# Patient Record
Sex: FEMALE | Race: OTHER | HISPANIC OR LATINO | Employment: OTHER | URBAN - METROPOLITAN AREA
[De-identification: names, ages, dates, MRNs, and addresses within clinical notes are randomized per-mention and may not be internally consistent; named-entity substitution may affect disease eponyms.]

---

## 2023-01-25 ENCOUNTER — HOSPITAL ENCOUNTER (EMERGENCY)
Facility: HOSPITAL | Age: 38
Discharge: HOME | End: 2023-01-25
Attending: EMERGENCY MEDICINE

## 2023-01-25 VITALS
TEMPERATURE: 99.1 F | WEIGHT: 151.24 LBS | DIASTOLIC BLOOD PRESSURE: 99 MMHG | HEART RATE: 74 BPM | SYSTOLIC BLOOD PRESSURE: 157 MMHG | RESPIRATION RATE: 20 BRPM | OXYGEN SATURATION: 97 %

## 2023-01-25 DIAGNOSIS — O03.9 MISCARRIAGE: Primary | ICD-10-CM

## 2023-01-25 DIAGNOSIS — N39.0 UTI (URINARY TRACT INFECTION): ICD-10-CM

## 2023-01-25 LAB
ABO GROUP BLD: NORMAL
B-HCG SERPL-ACNC: 10 MIU/ML (ref 0–11.6)
BACTERIA UR QL AUTO: ABNORMAL /HPF
BILIRUB UR QL STRIP: NEGATIVE
CLARITY UR: ABNORMAL
COLOR UR: ABNORMAL
EXT PREGNANCY TEST URINE: NEGATIVE
EXT. CONTROL: NORMAL
GLUCOSE UR STRIP-MCNC: NEGATIVE MG/DL
HGB UR QL STRIP.AUTO: ABNORMAL
KETONES UR STRIP-MCNC: ABNORMAL MG/DL
LEUKOCYTE ESTERASE UR QL STRIP: ABNORMAL
NITRITE UR QL STRIP: POSITIVE
NON-SQ EPI CELLS URNS QL MICRO: ABNORMAL /HPF
PH UR STRIP.AUTO: 5.5 [PH]
PROT UR STRIP-MCNC: ABNORMAL MG/DL
RBC #/AREA URNS AUTO: ABNORMAL /HPF
RH BLD: POSITIVE
SP GR UR STRIP.AUTO: >=1.03 (ref 1–1.03)
UROBILINOGEN UR QL STRIP.AUTO: 2 E.U./DL
WBC #/AREA URNS AUTO: ABNORMAL /HPF

## 2023-01-25 RX ORDER — CEPHALEXIN 500 MG/1
500 CAPSULE ORAL ONCE
Status: COMPLETED | OUTPATIENT
Start: 2023-01-25 | End: 2023-01-25

## 2023-01-25 RX ORDER — ACETAMINOPHEN 325 MG/1
975 TABLET ORAL ONCE
Status: COMPLETED | OUTPATIENT
Start: 2023-01-25 | End: 2023-01-25

## 2023-01-25 RX ORDER — CEPHALEXIN 500 MG/1
500 CAPSULE ORAL EVERY 6 HOURS SCHEDULED
Qty: 20 CAPSULE | Refills: 0 | Status: SHIPPED | OUTPATIENT
Start: 2023-01-25 | End: 2023-01-30

## 2023-01-25 RX ADMIN — ACETAMINOPHEN 975 MG: 325 TABLET, FILM COATED ORAL at 15:22

## 2023-01-25 RX ADMIN — CEPHALEXIN 500 MG: 500 CAPSULE ORAL at 15:22

## 2023-01-25 NOTE — DISCHARGE INSTRUCTIONS
We have provided information above for [facility]  Call the number above to speak to an   They will assist you with establishing care with a primary care provider  Your symptoms and labs are consistent with a first trimester miscarriage  Your urine also shows evidence of an infection  We have started you on a course of antibiotics to treat this infection  If you have any severe worsening pelvic pain, nausea, vomiting, fevers, chills, thick vaginal discharge or persistent heavy vaginal bleeding as evidenced by 2 soaked pads per hour for 2 consecutive hours, return to the emergency department

## 2023-01-25 NOTE — ED NOTES
Provider Ronna Smith at bedside        [facility]  01/25/23 0304 DISPLAY PLAN FREE TEXT

## 2023-01-25 NOTE — ED PROVIDER NOTES
History  Chief Complaint   Patient presents with   • Vaginal Bleeding - Pregnant     Approx 4 weeks pregnant  Started last night with some pink bleeding, today bleeding more with cramping      HPI  Patient is a 15-year-old  at approximately 4 weeks LMP presenting for evaluation of 2 days of vaginal bleeding, pelvic cramping, concern for passage of fetal tissue  Patient states that LMP was at approximately 12  Patient states that she had a positive home pregnancy test about a week ago  Patient states that she has not had an ultrasound confirming intrauterine pregnancy  Patient states that she noted some pink vaginal discharge yesterday concerning for bleeding and states that today she has soaked through a pad since this morning  Patient states intermittent pelvic cramping which she feels like has resolved  Patient believes that she passed fetal tissue last night  Patient denies vaginal discharge, denies any significant abdominal pain, fevers, chills, vaginal discharge, dysuria, hematuria  Patient states that her previous pregnancy was full-term, delivered breech and required   Prior to Admission Medications   Prescriptions Last Dose Informant Patient Reported? Taking? Prenatal w/o A Vit-Fe Fum-FA (PRENATA PO)   Yes Yes   Sig: Take by mouth in the morning      Facility-Administered Medications: None       Past Medical History:   Diagnosis Date   • Eczema        Past Surgical History:   Procedure Laterality Date   • BREAST SURGERY     •  SECTION     • LIPOSUCTION     • LUNG BIOPSY         History reviewed  No pertinent family history  I have reviewed and agree with the history as documented      E-Cigarette/Vaping   • E-Cigarette Use Never User      E-Cigarette/Vaping Substances     Social History     Tobacco Use   • Smoking status: Never   • Smokeless tobacco: Never   Vaping Use   • Vaping Use: Never used   Substance Use Topics   • Alcohol use: Not Currently   • Drug use: Not Currently       Review of Systems   Constitutional: Negative for chills and fever  Gastrointestinal: Negative for nausea and vomiting  Genitourinary: Positive for vaginal bleeding  Negative for dysuria, flank pain, frequency, hematuria, vaginal discharge and vaginal pain  All other systems reviewed and are negative  Physical Exam  Physical Exam  Vitals and nursing note reviewed  Constitutional:       General: She is not in acute distress  Appearance: Normal appearance  She is not ill-appearing, toxic-appearing or diaphoretic  Comments: Tearful appearing but nontoxic nondistressed   HENT:      Head: Normocephalic and atraumatic  Comments: Moist mucous membranes     Right Ear: External ear normal       Left Ear: External ear normal    Eyes:      General:         Right eye: No discharge  Left eye: No discharge  Cardiovascular:      Comments: Regular rate and rhythm, no murmurs rubs or gallops  Extremities warm and well-perfused without mottling  Pulmonary:      Effort: No respiratory distress  Abdominal:      General: There is no distension  Genitourinary:     Comments: Trace suprapubic tenderness  Abdomen otherwise nontender, nondistended without rigidity, rebound, guarding  Musculoskeletal:         General: No deformity  Cervical back: Normal range of motion  Skin:     Findings: No lesion or rash  Neurological:      Mental Status: She is alert and oriented to person, place, and time  Mental status is at baseline        Comments: AAOx3   Psychiatric:         Mood and Affect: Mood and affect normal          Vital Signs  ED Triage Vitals [01/25/23 1306]   Temperature Pulse Respirations Blood Pressure SpO2   99 1 °F (37 3 °C) 74 20 157/99 97 %      Temp Source Heart Rate Source Patient Position - Orthostatic VS BP Location FiO2 (%)   Tympanic Monitor Sitting Right arm --      Pain Score       3           Vitals:    01/25/23 1306   BP: 157/99   Pulse: 74   Patient Position - Orthostatic VS: Sitting         Visual Acuity      ED Medications  Medications   cephalexin (KEFLEX) capsule 500 mg (500 mg Oral Given 1/25/23 1522)   acetaminophen (TYLENOL) tablet 975 mg (975 mg Oral Given 1/25/23 1522)       Diagnostic Studies  Results Reviewed     Procedure Component Value Units Date/Time    hCG, quantitative [[id number]]  (Normal) Collected: 01/25/23 1416    Lab Status: Final result Specimen: Blood from Arm, Right Updated: 01/25/23 1450     HCG, Quant 10 mIU/mL     Narrative:       Expected Ranges:     Approximate               Approximate HCG  Gestation age          Concentration ( mIU/mL)  _____________          ______________________   Lindsay Sanchez                      HCG values  0 2-1                       5-50  1-2                           2-3                         100-5000  3-4                         500-21573  4-5                         1000-02911  5-6                         [id number]  6-8                         [id number]  8-12                        [id number]      Urinalysis with microscopic [[id number]]  (Abnormal) Collected: 01/25/23 1416    Lab Status: Final result Specimen: Urine, Clean Catch Updated: 01/25/23 1442     Color, UA Dark Mar     Clarity, UA Slightly Cloudy     Specific Gravity, UA >=1 030     pH, UA 5 5     Leukocytes, UA Small     Nitrite, UA Positive     Protein,  (2+) mg/dl      Glucose, UA Negative mg/dl      Ketones, UA Trace mg/dl      Urobilinogen, UA 2 0 E U /dl      Bilirubin, UA Negative     Occult Blood, UA Large     RBC, UA Innumerable /hpf      WBC, UA       Field obscured, unable to enumerate     /hpf     Epithelial Cells Occasional /hpf      Bacteria, UA       Field obscured, unable to enumerate     /hpf    POCT pregnancy, urine [[id number]]  (Normal) Resulted: 01/25/23 1418    Lab Status: Final result Updated: 01/25/23 1418     EXT Preg Test, Ur Negative     Control Valid                 No orders to display Procedures  Procedures         ED Course                               SBIRT 20yo+    Flowsheet Row Most Recent Value   SBIRT (23 yo +)    In order to provide better care to our patients, we are screening all of our patients for alcohol and drug use  Would it be okay to ask you these screening questions? Yes Filed at: 01/25/2023 1330   Initial Alcohol Screen: US AUDIT-C     1  How often do you have a drink containing alcohol? 0 Filed at: 01/25/2023 1330   2  How many drinks containing alcohol do you have on a typical day you are drinking? 0 Filed at: 01/25/2023 1330   3a  Male UNDER 65: How often do you have five or more drinks on one occasion? 0 Filed at: 01/25/2023 1330   3b  FEMALE Any Age, or MALE 65+: How often do you have 4 or more drinks on one occassion? 0 Filed at: 01/25/2023 1330   Audit-C Score 0 Filed at: 01/25/2023 1330   SAWYER: How many times in the past year have you    Used an illegal drug or used a prescription medication for non-medical reasons? Never Filed at: 01/25/2023 1330                    Medical Decision Making  I obtained history from the patient  Patient with pelvic cramping, vaginal bleeding, likely passage of fetal tissue suggestive of first trimester miscarriage  Plan to confirm urine pregnancy test with follow-up quantitative hCG  Patient with a negative urine pregnancy test however quantitative hCG measured at 10  Given patient's recent positive urine pregnancy test, pelvic cramping, passage of tissue, low quantitative hCG, likely represents miscarriage  Patient instructed on expectant management  Urinalysis additionally demonstrating evidence of asymptomatic bacteriuria which was treated with Keflex  Discharged with return precautions  Miscarriage: acute illness or injury  UTI (urinary tract infection): acute illness or injury  Amount and/or Complexity of Data Reviewed  Labs: ordered  Risk  OTC drugs  Prescription drug management            Disposition  Final diagnoses:   Miscarriage   UTI (urinary tract infection)     Time reflects when diagnosis was documented in both MDM as applicable and the Disposition within this note     Time User Action Codes Description Comment    1/25/2023  3:09 PM Yusra Foley Add [O03 9] Miscarriage     1/25/2023  3:09 PM Yusra Foley Add [N39 0] UTI (urinary tract infection)       ED Disposition     ED Disposition   Discharge    Condition   Stable    Date/Time   Wed Jan 25, 2023  3:09 PM    Comment   Gal Bell discharge to home/self care  Follow-up Information     Follow up With Specialties Details Why Contact Info Additional Information    [address] [facility] Emergency Department Emergency Medicine  If symptoms worsen [address] [facility] [address]  [address] [address] Emergency Department, [facility], [address] [place] [address],[address] [facility]    [phone]             Discharge Medication List as of 1/25/2023  3:11 PM      START taking these medications    Details   cephalexin (KEFLEX) 500 mg capsule Take 1 capsule (500 mg total) by mouth every 6 (six) hours for 5 days, Starting Wed 1/25/2023, Until Mon 1/30/2023, Normal         CONTINUE these medications which have NOT CHANGED    Details   Prenatal w/o A Vit-Fe Fum-FA (PRENATA PO) Take by mouth in the morning, Historical Med             No discharge procedures on file      PDMP Review     None          ED Provider  Electronically Signed by           Radha Matta MD  01/25/23 9380

## 2025-06-25 ENCOUNTER — HOSPITAL ENCOUNTER (EMERGENCY)
Facility: HOSPITAL | Age: 40
Discharge: HOME | End: 2025-06-25
Attending: EMERGENCY MEDICINE | Admitting: EMERGENCY MEDICINE
Payer: MEDICAID

## 2025-06-25 VITALS
TEMPERATURE: 97.2 F | OXYGEN SATURATION: 98 % | RESPIRATION RATE: 20 BRPM | HEART RATE: 67 BPM | DIASTOLIC BLOOD PRESSURE: 85 MMHG | SYSTOLIC BLOOD PRESSURE: 130 MMHG | WEIGHT: 158 LBS

## 2025-06-25 DIAGNOSIS — S91.312A LACERATION OF LEFT FOOT, INITIAL ENCOUNTER: Primary | ICD-10-CM

## 2025-06-25 DIAGNOSIS — S91.311A LACERATION OF RIGHT FOOT, INITIAL ENCOUNTER: ICD-10-CM

## 2025-06-25 PROCEDURE — 99284 EMERGENCY DEPT VISIT MOD MDM: CPT | Performed by: EMERGENCY MEDICINE

## 2025-06-25 PROCEDURE — 99282 EMERGENCY DEPT VISIT SF MDM: CPT

## 2025-06-25 PROCEDURE — 12001 RPR S/N/AX/GEN/TRNK 2.5CM/<: CPT | Performed by: EMERGENCY MEDICINE

## 2025-06-25 RX ORDER — GINSENG 100 MG
1 CAPSULE ORAL ONCE
Status: COMPLETED | OUTPATIENT
Start: 2025-06-25 | End: 2025-06-25

## 2025-06-25 RX ORDER — LIDOCAINE HYDROCHLORIDE AND EPINEPHRINE 10; 10 MG/ML; UG/ML
5 INJECTION, SOLUTION INFILTRATION; PERINEURAL ONCE
Status: COMPLETED | OUTPATIENT
Start: 2025-06-25 | End: 2025-06-25

## 2025-06-25 RX ADMIN — LIDOCAINE HYDROCHLORIDE,EPINEPHRINE BITARTRATE 5 ML: 10; .01 INJECTION, SOLUTION INFILTRATION; PERINEURAL at 22:31

## 2025-06-25 RX ADMIN — BACITRACIN ZINC 1 SMALL APPLICATION: 500 OINTMENT TOPICAL at 22:41

## 2025-06-26 NOTE — ED PROVIDER NOTES
Time reflects when diagnosis was documented in both MDM as applicable and the Disposition within this note       Time User Action Codes Description Comment    6/25/2025 10:29 PM Val Zurita Add [S91.319A] Foot laceration     6/25/2025 10:29 PM Yudith Val A Remove [S91.319A] Foot laceration     6/25/2025 10:30 PM YudithNataliyamaría KULKARNI Add [S91.312A] Laceration of left foot, initial encounter     6/25/2025 10:30 PM YudithNataliyaan CAYLA Add [S91.311A] Laceration of right foot, initial encounter           ED Disposition       ED Disposition   Discharge    Condition   Stable    Date/Time   Wed Jun 25, 2025 10:29 PM    Comment   Connie Cm discharge to home/self care.                   Assessment & Plan       Medical Decision Making  Advised of wound care, signs of infection.  Pt. Wants to wait and check with her PCP about her tetanus status.  Advised she should get it within 3 days if she needs it.    Risk  OTC drugs.  Prescription drug management.             Medications   bacitracin topical ointment 1 small application (has no administration in time range)   lidocaine-epinephrine (XYLOCAINE/EPINEPHRINE) 1 %-1:100,000 injection 5 mL (5 mL Infiltration Given 6/25/25 2231)       ED Risk Strat Scores                    No data recorded                            History of Present Illness       Chief Complaint   Patient presents with    Foot Laceration     Opened refridgerator and glass container fell and broke, cutting R ankle and foot and some on L as well       Past Medical History[1]   Past Surgical History[2]   Family History[3]   Social History[4]   E-Cigarette/Vaping    E-Cigarette Use Never User       E-Cigarette/Vaping Substances      I have reviewed and agree with the history as documented.     40 yo female dropped glass container on feet and sustained lacs to both feet.  This occurred 3 hours ago.  She came in because the lac on the right foot wound not stop bleeding.  She is unsure of last tetanus  booster.      History provided by:  Patient   used: No    Foot Laceration      Review of Systems        Objective       ED Triage Vitals [06/25/25 2133]   Temperature Pulse Blood Pressure Respirations SpO2 Patient Position - Orthostatic VS   (!) 97.2 °F (36.2 °C) 67 130/85 20 98 % Sitting      Temp Source Heart Rate Source BP Location FiO2 (%) Pain Score    Tympanic Monitor Right arm -- 5      Vitals      Date and Time Temp Pulse SpO2 Resp BP Pain Score FACES Pain Rating User   06/25/25 2133 97.2 °F (36.2 °C) 67 98 % 20 130/85 5 -- LS            Physical Exam  Vitals and nursing note reviewed.   Constitutional:       General: She is not in acute distress.     Appearance: She is not ill-appearing.   Pulmonary:      Effort: Pulmonary effort is normal. No respiratory distress.     Skin:     General: Skin is warm and dry.      Comments: Ball of Left foot medial superficial puncture/flap about 0.5 cm, bleeding controlled, no FB.   Right foot over medial ankle +1.5 cm straight lac, consistently oozing, no FB.     Neurological:      General: No focal deficit present.      Mental Status: She is alert and oriented to person, place, and time.     Psychiatric:         Mood and Affect: Mood normal.         Behavior: Behavior normal.         Results Reviewed       None            No orders to display         Universal Protocol:  procedure performed by consultantConsent: Verbal consent obtained  Consent given by: patient  Patient identity confirmed: verbally with patient  Laceration repair    Date/Time: 6/25/2025 10:36 PM    Performed by: Val Zurita MD  Authorized by: Val Zurita MD  Body area: lower extremity  Location details: right ankle  Laceration length: 1.5 cm  Contaminated: none.  Foreign bodies: no foreign bodies  Tendon involvement: none  Nerve involvement: none  Anesthesia: local infiltration    Anesthesia:  Local Anesthetic: lidocaine 1% with epinephrine    Sedation:  Patient sedated:  no      Wound Dehiscence:  Superficial Wound Dehiscence: simple closure      Procedure Details:  Preparation: Patient was prepped and draped in the usual sterile fashion.  Irrigation solution: saline  Irrigation method: syringe  Amount of cleaning: standard  Debridement: none  Degree of undermining: none  Skin closure: 5-0 nylon  Number of sutures: 4  Technique: simple  Approximation: close  Approximation difficulty: simple  Patient tolerance: patient tolerated the procedure well with no immediate complications          ED Medication and Procedure Management   Prior to Admission Medications   Prescriptions Last Dose Informant Patient Reported? Taking?   Prenatal w/o A Vit-Fe Fum-FA (PRENATA PO) Not Taking  Yes No   Sig: Take by mouth in the morning   Patient not taking: Reported on 2025      Facility-Administered Medications: None     Patient's Medications   Discharge Prescriptions    No medications on file     No discharge procedures on file.  ED SEPSIS DOCUMENTATION   Time reflects when diagnosis was documented in both MDM as applicable and the Disposition within this note       Time User Action Codes Description Comment    2025 10:29 PM Val Zurita Add [S91.319A] Foot laceration     2025 10:29 PM Val Zurita Remove [S91.319A] Foot laceration     2025 10:30 PM Val Zurita Add [S91.312A] Laceration of left foot, initial encounter     2025 10:30 PM Val Zurita Add [S91.311A] Laceration of right foot, initial encounter                    [1]   Past Medical History:  Diagnosis Date    Eczema    [2]   Past Surgical History:  Procedure Laterality Date    BREAST SURGERY       SECTION      LIPOSUCTION      LUNG BIOPSY     [3] No family history on file.  [4]   Social History  Tobacco Use    Smoking status: Never    Smokeless tobacco: Never   Vaping Use    Vaping status: Never Used   Substance Use Topics    Alcohol use: Not Currently    Drug use: Not Currently         Val Zurita MD  06/25/25 7639

## 2025-06-26 NOTE — DISCHARGE INSTRUCTIONS
Clean daily gently with peroxide on a Q-tip.  Check wound for signs of infection.  Apply sparingly topical neosporin once daily.  Keep covered and protected.  Suture removal in 7-10 days.  Return to ER if fever, redness, swelling or unusual pain.  Check with your PCP for you last tetanus booster.